# Patient Record
Sex: FEMALE | Race: WHITE | NOT HISPANIC OR LATINO | Employment: OTHER | URBAN - NONMETROPOLITAN AREA
[De-identification: names, ages, dates, MRNs, and addresses within clinical notes are randomized per-mention and may not be internally consistent; named-entity substitution may affect disease eponyms.]

---

## 2022-05-19 ENCOUNTER — HISTORICAL (OUTPATIENT)
Dept: ADMINISTRATIVE | Facility: HOSPITAL | Age: 82
End: 2022-05-19

## 2022-05-27 ENCOUNTER — HISTORICAL (OUTPATIENT)
Dept: ADMINISTRATIVE | Facility: HOSPITAL | Age: 82
End: 2022-05-27

## 2022-05-31 ENCOUNTER — HISTORICAL (OUTPATIENT)
Dept: ADMINISTRATIVE | Facility: HOSPITAL | Age: 82
End: 2022-05-31

## 2022-06-28 ENCOUNTER — HISTORICAL (OUTPATIENT)
Dept: ADMINISTRATIVE | Facility: HOSPITAL | Age: 82
End: 2022-06-28

## 2023-07-24 ENCOUNTER — HOSPITAL ENCOUNTER (EMERGENCY)
Facility: HOSPITAL | Age: 83
Discharge: HOME | End: 2023-07-24
Attending: FAMILY MEDICINE
Payer: MEDICARE

## 2023-07-24 VITALS
DIASTOLIC BLOOD PRESSURE: 74 MMHG | SYSTOLIC BLOOD PRESSURE: 166 MMHG | TEMPERATURE: 99 F | OXYGEN SATURATION: 97 % | WEIGHT: 189 LBS | RESPIRATION RATE: 18 BRPM | BODY MASS INDEX: 31.49 KG/M2 | HEART RATE: 85 BPM | HEIGHT: 65 IN

## 2023-07-24 DIAGNOSIS — M62.838 MUSCLE SPASM: Primary | ICD-10-CM

## 2023-07-24 LAB
ANION GAP SERPL CALC-SCNC: 10 MEQ/L (ref 2–13)
BASOPHILS # BLD AUTO: 0.02 X10(3)/MCL (ref 0.01–0.08)
BASOPHILS NFR BLD AUTO: 0.2 % (ref 0.1–1.2)
BUN SERPL-MCNC: 17 MG/DL (ref 7–20)
CALCIUM SERPL-MCNC: 10 MG/DL (ref 8.4–10.2)
CHLORIDE SERPL-SCNC: 101 MMOL/L (ref 98–110)
CO2 SERPL-SCNC: 26 MMOL/L (ref 21–32)
CREAT SERPL-MCNC: 0.75 MG/DL (ref 0.66–1.25)
CREAT/UREA NIT SERPL: 23 (ref 12–20)
EOSINOPHIL # BLD AUTO: 0.1 X10(3)/MCL (ref 0.04–0.36)
EOSINOPHIL NFR BLD AUTO: 1.2 % (ref 0.7–7)
ERYTHROCYTE [DISTWIDTH] IN BLOOD BY AUTOMATED COUNT: 13.9 % (ref 11–14.5)
GFR SERPLBLD CREATININE-BSD FMLA CKD-EPI: 80 MLS/MIN/1.73/M2
GLUCOSE SERPL-MCNC: 169 MG/DL (ref 70–115)
HCT VFR BLD AUTO: 40.3 % (ref 36–48)
HGB BLD-MCNC: 13.7 G/DL (ref 11.8–16)
IMM GRANULOCYTES # BLD AUTO: 0.02 X10(3)/MCL (ref 0–0.03)
IMM GRANULOCYTES NFR BLD AUTO: 0.2 % (ref 0–0.5)
LYMPHOCYTES # BLD AUTO: 1.5 X10(3)/MCL (ref 1.16–3.74)
LYMPHOCYTES NFR BLD AUTO: 18.5 % (ref 20–55)
MCH RBC QN AUTO: 27.9 PG (ref 27–34)
MCHC RBC AUTO-ENTMCNC: 34 G/DL (ref 31–37)
MCV RBC AUTO: 82.1 FL (ref 79–99)
MONOCYTES # BLD AUTO: 0.74 X10(3)/MCL (ref 0.24–0.36)
MONOCYTES NFR BLD AUTO: 9.1 % (ref 4.7–12.5)
NEUTROPHILS # BLD AUTO: 5.75 X10(3)/MCL (ref 1.56–6.13)
NEUTROPHILS NFR BLD AUTO: 70.8 % (ref 37–73)
NRBC BLD AUTO-RTO: 0 %
PLATELET # BLD AUTO: 200 X10(3)/MCL (ref 140–371)
PMV BLD AUTO: 9.3 FL (ref 9.4–12.4)
POTASSIUM SERPL-SCNC: 4.6 MMOL/L (ref 3.5–5.1)
RBC # BLD AUTO: 4.91 X10(6)/MCL (ref 4–5.1)
SODIUM SERPL-SCNC: 137 MMOL/L (ref 135–145)
WBC # SPEC AUTO: 8.13 X10(3)/MCL (ref 4–11.5)

## 2023-07-24 PROCEDURE — 99283 EMERGENCY DEPT VISIT LOW MDM: CPT

## 2023-07-24 PROCEDURE — 36415 COLL VENOUS BLD VENIPUNCTURE: CPT | Performed by: FAMILY MEDICINE

## 2023-07-24 PROCEDURE — 85025 COMPLETE CBC W/AUTO DIFF WBC: CPT | Performed by: FAMILY MEDICINE

## 2023-07-24 PROCEDURE — 80048 BASIC METABOLIC PNL TOTAL CA: CPT | Performed by: FAMILY MEDICINE

## 2023-07-24 RX ORDER — CYCLOBENZAPRINE HCL 10 MG
5 TABLET ORAL 3 TIMES DAILY PRN
Qty: 15 TABLET | Refills: 0 | Status: SHIPPED | OUTPATIENT
Start: 2023-07-24 | End: 2023-07-24 | Stop reason: ALTCHOICE

## 2023-07-24 RX ORDER — CYCLOBENZAPRINE HCL 5 MG
5 TABLET ORAL 3 TIMES DAILY PRN
Qty: 15 TABLET | Refills: 0 | Status: SHIPPED | OUTPATIENT
Start: 2023-07-24 | End: 2023-07-29

## 2023-07-24 NOTE — ED PROVIDER NOTES
Encounter Date: 7/24/2023       History     Chief Complaint   Patient presents with    Spasms     TO ED WITH C/O MUSCLE SPASMS TO RIGHT UPPER LEG X 3 DAYS. HER PCP DR HILARIO IS VACATION.      82-year-old white female presents to the emergency room complaining of right thigh muscle spasm for the last 3 days.  Patient denies any known trauma.  Patient also complains of chronic swelling to bilateral lower extremities but she states Dr. Chappell's told her she is on all the medications she can not have at the present time due to that situation.  Patient denies any other complaints.    The history is provided by the patient.   Review of patient's allergies indicates:   Allergen Reactions    Pcn [penicillins]      Past Medical History:   Diagnosis Date    Diabetes mellitus     Hypertension      History reviewed. No pertinent surgical history.  History reviewed. No pertinent family history.  Social History     Tobacco Use    Smoking status: Never    Smokeless tobacco: Never   Substance Use Topics    Alcohol use: Never    Drug use: Never     Review of Systems   Musculoskeletal:         Muscle spasms   All other systems reviewed and are negative.    Physical Exam     Initial Vitals [07/24/23 1003]   BP Pulse Resp Temp SpO2   (!) 166/81 94 20 98.7 °F (37.1 °C) 97 %      MAP       --         Physical Exam    Nursing note and vitals reviewed.  Constitutional:   Well-developed well-nourished white female alert in no acute distress.   HENT:   Head is atraumatic and normocephalic.  Oropharynx is clear moist mucous membranes.  Nose is clear with no discharge.   Neck:   Neck is supple with full range of motion no cervical lymphadenopathy noted.   Cardiovascular:            Heart is regular rate and rhythm without murmur.   Pulmonary/Chest:   Lungs are clear to auscultation bilaterally.   Abdominal:   Abdomen positive bowel sounds throughout.  Abdomen is soft and nontender no guarding or rebound.  No CVA tenderness noted.    Musculoskeletal:      Comments: Extremities with full range of motion throughout with mild-to-moderate right anterior thigh tenderness.  Two to 3+ pretibial edema bilateral lower extremities.  No clubbing or cyanosis noted.     Neurological:   Patient is alert and oriented x3.  Bilateral upper and lower extremities normal sensation and strength.   Skin:   Skin is warm and dry.   Psychiatric:   Patient's mood and affect are normal.  Patient's thought content behavior normal as well.       ED Course   Procedures  Labs Reviewed   BASIC METABOLIC PANEL - Abnormal; Notable for the following components:       Result Value    Glucose Level 169 (*)     BUN/Creatinine Ratio 23 (*)     All other components within normal limits   CBC WITH DIFFERENTIAL - Abnormal; Notable for the following components:    MPV 9.3 (*)     Lymph % 18.5 (*)     Mono # 0.74 (*)     All other components within normal limits   CBC W/ AUTO DIFFERENTIAL    Narrative:     The following orders were created for panel order CBC auto differential.  Procedure                               Abnormality         Status                     ---------                               -----------         ------                     CBC with Differential[[id number]]        Abnormal            Final result                 Please view results for these tests on the individual orders.          Imaging Results    None          Medications - No data to display  Medical Decision Making:   Initial Assessment:   Muscle spasms  Differential Diagnosis:   Hypokalemia, dependent edema  Clinical Tests:   Lab Tests: Ordered and Reviewed  The following lab test(s) were unremarkable: CBC and BMP  ED Management:  I will go ahead and order CBC and BMP to check her electrolytes.  Patient's blood work is all normal.  I will go ahead and place patient on Flexeril 5 mg t.i.d. as needed for muscle spasm and have her follow up with Dr. Bradley.                        Clinical Impression:    Final diagnoses:  [M62.838] Muscle spasm (Primary)               Tyrese Nogueira MD  07/24/23 1126

## 2023-08-15 ENCOUNTER — HOSPITAL ENCOUNTER (EMERGENCY)
Facility: HOSPITAL | Age: 83
Discharge: HOME | End: 2023-08-15
Payer: MEDICARE

## 2023-08-15 VITALS
OXYGEN SATURATION: 98 % | BODY MASS INDEX: 31.32 KG/M2 | WEIGHT: 188 LBS | DIASTOLIC BLOOD PRESSURE: 62 MMHG | RESPIRATION RATE: 18 BRPM | HEART RATE: 68 BPM | TEMPERATURE: 98 F | SYSTOLIC BLOOD PRESSURE: 190 MMHG | HEIGHT: 65 IN

## 2023-08-15 DIAGNOSIS — F41.9 ANXIETY: ICD-10-CM

## 2023-08-15 DIAGNOSIS — N30.00 ACUTE CYSTITIS WITHOUT HEMATURIA: ICD-10-CM

## 2023-08-15 DIAGNOSIS — I10 UNCONTROLLED HYPERTENSION: ICD-10-CM

## 2023-08-15 DIAGNOSIS — R42 DIZZINESS: ICD-10-CM

## 2023-08-15 DIAGNOSIS — R42 EPISODIC LIGHTHEADEDNESS: Primary | ICD-10-CM

## 2023-08-15 LAB
ALBUMIN SERPL-MCNC: 4 G/DL (ref 3.4–5)
ALBUMIN/GLOB SERPL: 1.3 RATIO
ALP SERPL-CCNC: 50 UNIT/L (ref 50–144)
ALT SERPL-CCNC: 26 UNIT/L (ref 1–45)
ANION GAP SERPL CALC-SCNC: 12 MEQ/L (ref 2–13)
APPEARANCE UR: CLEAR
AST SERPL-CCNC: 29 UNIT/L (ref 14–36)
BACTERIA #/AREA URNS AUTO: ABNORMAL /HPF
BASOPHILS # BLD AUTO: 0.05 X10(3)/MCL (ref 0.01–0.08)
BASOPHILS NFR BLD AUTO: 0.7 % (ref 0.1–1.2)
BILIRUB SERPL-MCNC: 0.5 MG/DL (ref 0–1)
BILIRUB UR QL STRIP.AUTO: NEGATIVE
BUN SERPL-MCNC: 14 MG/DL (ref 7–20)
CALCIUM SERPL-MCNC: 9.2 MG/DL (ref 8.4–10.2)
CHLORIDE SERPL-SCNC: 106 MMOL/L (ref 98–110)
CO2 SERPL-SCNC: 23 MMOL/L (ref 21–32)
COLOR UR: YELLOW
CREAT SERPL-MCNC: 0.68 MG/DL (ref 0.66–1.25)
CREAT/UREA NIT SERPL: 21 (ref 12–20)
EOSINOPHIL # BLD AUTO: 0.3 X10(3)/MCL (ref 0.04–0.36)
EOSINOPHIL NFR BLD AUTO: 3.9 % (ref 0.7–7)
ERYTHROCYTE [DISTWIDTH] IN BLOOD BY AUTOMATED COUNT: 14.4 % (ref 11–14.5)
GFR SERPLBLD CREATININE-BSD FMLA CKD-EPI: 88 MLS/MIN/1.73/M2
GLOBULIN SER-MCNC: 3.1 GM/DL (ref 2–3.9)
GLUCOSE SERPL-MCNC: 66 MG/DL (ref 70–115)
GLUCOSE UR QL STRIP.AUTO: NEGATIVE
HCT VFR BLD AUTO: 37.8 % (ref 36–48)
HGB BLD-MCNC: 12.9 G/DL (ref 11.8–16)
IMM GRANULOCYTES # BLD AUTO: 0.01 X10(3)/MCL (ref 0–0.03)
IMM GRANULOCYTES NFR BLD AUTO: 0.1 % (ref 0–0.5)
KETONES UR QL STRIP.AUTO: NEGATIVE
LEUKOCYTE ESTERASE UR QL STRIP.AUTO: ABNORMAL
LYMPHOCYTES # BLD AUTO: 2.61 X10(3)/MCL (ref 1.16–3.74)
LYMPHOCYTES NFR BLD AUTO: 34.1 % (ref 20–55)
MCH RBC QN AUTO: 28.4 PG (ref 27–34)
MCHC RBC AUTO-ENTMCNC: 34.1 G/DL (ref 31–37)
MCV RBC AUTO: 83.3 FL (ref 79–99)
MONOCYTES # BLD AUTO: 0.88 X10(3)/MCL (ref 0.24–0.36)
MONOCYTES NFR BLD AUTO: 11.5 % (ref 4.7–12.5)
NEUTROPHILS # BLD AUTO: 3.8 X10(3)/MCL (ref 1.56–6.13)
NEUTROPHILS NFR BLD AUTO: 49.7 % (ref 37–73)
NITRITE UR QL STRIP.AUTO: NEGATIVE
NRBC BLD AUTO-RTO: 0 %
PH UR STRIP.AUTO: 6 [PH]
PLATELET # BLD AUTO: 180 X10(3)/MCL (ref 140–371)
PMV BLD AUTO: 9.7 FL (ref 9.4–12.4)
POTASSIUM SERPL-SCNC: 4 MMOL/L (ref 3.5–5.1)
PROT SERPL-MCNC: 7.1 GM/DL (ref 6.3–8.2)
PROT UR QL STRIP.AUTO: ABNORMAL
RBC # BLD AUTO: 4.54 X10(6)/MCL (ref 4–5.1)
RBC #/AREA URNS AUTO: ABNORMAL /HPF
RBC UR QL AUTO: ABNORMAL
SODIUM SERPL-SCNC: 141 MMOL/L (ref 135–145)
SP GR UR STRIP.AUTO: 1.01
SQUAMOUS #/AREA URNS AUTO: ABNORMAL /HPF
UROBILINOGEN UR STRIP-ACNC: 1
WBC # SPEC AUTO: 7.65 X10(3)/MCL (ref 4–11.5)
WBC #/AREA URNS AUTO: ABNORMAL /HPF

## 2023-08-15 PROCEDURE — 36415 COLL VENOUS BLD VENIPUNCTURE: CPT

## 2023-08-15 PROCEDURE — 93010 ELECTROCARDIOGRAM REPORT: CPT | Mod: ,,, | Performed by: INTERNAL MEDICINE

## 2023-08-15 PROCEDURE — 63600175 PHARM REV CODE 636 W HCPCS

## 2023-08-15 PROCEDURE — 93005 ELECTROCARDIOGRAM TRACING: CPT

## 2023-08-15 PROCEDURE — 80053 COMPREHEN METABOLIC PANEL: CPT

## 2023-08-15 PROCEDURE — 81001 URINALYSIS AUTO W/SCOPE: CPT

## 2023-08-15 PROCEDURE — 87088 URINE BACTERIA CULTURE: CPT

## 2023-08-15 PROCEDURE — 96376 TX/PRO/DX INJ SAME DRUG ADON: CPT

## 2023-08-15 PROCEDURE — 96365 THER/PROPH/DIAG IV INF INIT: CPT

## 2023-08-15 PROCEDURE — 96375 TX/PRO/DX INJ NEW DRUG ADDON: CPT

## 2023-08-15 PROCEDURE — 25000003 PHARM REV CODE 250

## 2023-08-15 PROCEDURE — 85025 COMPLETE CBC W/AUTO DIFF WBC: CPT

## 2023-08-15 PROCEDURE — 93010 EKG 12-LEAD: ICD-10-PCS | Mod: ,,, | Performed by: INTERNAL MEDICINE

## 2023-08-15 PROCEDURE — 87077 CULTURE AEROBIC IDENTIFY: CPT

## 2023-08-15 PROCEDURE — 99285 EMERGENCY DEPT VISIT HI MDM: CPT | Mod: 25

## 2023-08-15 RX ORDER — DIAZEPAM 5 MG/1
5 TABLET ORAL
Status: COMPLETED | OUTPATIENT
Start: 2023-08-15 | End: 2023-08-15

## 2023-08-15 RX ORDER — LABETALOL HYDROCHLORIDE 5 MG/ML
10 INJECTION, SOLUTION INTRAVENOUS
Status: DISCONTINUED | OUTPATIENT
Start: 2023-08-15 | End: 2023-08-15

## 2023-08-15 RX ORDER — LABETALOL HYDROCHLORIDE 5 MG/ML
20 INJECTION, SOLUTION INTRAVENOUS
Status: DISCONTINUED | OUTPATIENT
Start: 2023-08-15 | End: 2023-08-15

## 2023-08-15 RX ORDER — NITROFURANTOIN 25; 75 MG/1; MG/1
100 CAPSULE ORAL 2 TIMES DAILY
Qty: 10 CAPSULE | Refills: 0 | Status: SHIPPED | OUTPATIENT
Start: 2023-08-15 | End: 2023-08-20

## 2023-08-15 RX ORDER — LABETALOL HCL 20 MG/4 ML
20 SYRINGE (ML) INTRAVENOUS
Status: COMPLETED | OUTPATIENT
Start: 2023-08-15 | End: 2023-08-15

## 2023-08-15 RX ORDER — LABETALOL HCL 20 MG/4 ML
10 SYRINGE (ML) INTRAVENOUS
Status: COMPLETED | OUTPATIENT
Start: 2023-08-15 | End: 2023-08-15

## 2023-08-15 RX ADMIN — LABETALOL HYDROCHLORIDE 10 MG: 5 INJECTION, SOLUTION INTRAVENOUS at 09:08

## 2023-08-15 RX ADMIN — DEXTROSE MONOHYDRATE 2 G: 5 INJECTION INTRAVENOUS at 10:08

## 2023-08-15 RX ADMIN — LABETALOL HYDROCHLORIDE 20 MG: 5 INJECTION, SOLUTION INTRAVENOUS at 10:08

## 2023-08-15 RX ADMIN — DIAZEPAM 5 MG: 5 TABLET ORAL at 09:08

## 2023-08-16 NOTE — ED PROVIDER NOTES
Encounter Date: 8/15/2023       History     Chief Complaint   Patient presents with    Dizziness      PT STATES JUST SITTING IN HER CHAIR W/ ONSET OF DIZZINESS. ALSO STATES RECENT CHANGE IN BP MEDS. ALSO STATES HX OF DM. PT DENIES ANY CHEST PAIN.    Hypertension     82-year-old female presents complaining of episode of lightheadedness prior to arrival.  She reports this has happened a few times before, and she contributes it to low blood pressure.  She specifically says the room is not spinning.  There was no nausea associated with the lightheadedness.  She denies any visual change, any headache, or any weakness.  On arrival, her blood pressure is extremely high.  She says she wants to go home, and does not like emergency rooms.    The history is provided by the patient.     Review of patient's allergies indicates:   Allergen Reactions    Pcn [penicillins]      Past Medical History:   Diagnosis Date    Diabetes mellitus     Hypertension      Past Surgical History:   Procedure Laterality Date    APPENDECTOMY      HYSTERECTOMY       History reviewed. No pertinent family history.  Social History     Tobacco Use    Smoking status: Never    Smokeless tobacco: Never   Substance Use Topics    Alcohol use: Never    Drug use: Never     Review of Systems   Constitutional:  Negative for fever.   HENT:  Negative for sore throat.    Respiratory:  Negative for shortness of breath.    Cardiovascular:  Negative for chest pain.   Gastrointestinal:  Negative for nausea.   Genitourinary:  Negative for dysuria.   Musculoskeletal:  Negative for back pain.   Skin:  Negative for rash.   Neurological:  Positive for light-headedness. Negative for weakness.   Hematological:  Does not bruise/bleed easily.   Psychiatric/Behavioral:  The patient is nervous/anxious.    All other systems reviewed and are negative.      Physical Exam     Initial Vitals [08/15/23 2140]   BP Pulse Resp Temp SpO2   (!) 248/95 63 18 98.4 °F (36.9 °C) 96 %      MAP        --         Physical Exam    Nursing note and vitals reviewed.  Constitutional: Vital signs are normal. She appears well-developed and well-nourished. She is cooperative.   HENT:   Head: Normocephalic and atraumatic.   Right Ear: External ear normal.   Left Ear: External ear normal.   Mouth/Throat: Oropharynx is clear and moist.   Eyes: Conjunctivae, EOM and lids are normal. Pupils are equal, round, and reactive to light.   Neck: Trachea normal. Neck supple.   Normal range of motion.  Cardiovascular:  Normal rate, regular rhythm, normal heart sounds and intact distal pulses.           Pulmonary/Chest: Breath sounds normal.   Abdominal: Abdomen is soft. Bowel sounds are normal.   Musculoskeletal:         General: Normal range of motion.      Cervical back: Normal, normal range of motion and neck supple.      Lumbar back: Normal.     Neurological: She is alert and oriented to person, place, and time. She has normal strength. Coordination normal. GCS score is 15. GCS eye subscore is 4. GCS verbal subscore is 5. GCS motor subscore is 6.   Skin: Skin is warm, dry and intact. Capillary refill takes less than 2 seconds.   Psychiatric: Her speech is normal and behavior is normal. Judgment and thought content normal. Cognition and memory are normal.   Patient appears a bit anxious         ED Course   Procedures  Labs Reviewed   COMPREHENSIVE METABOLIC PANEL - Abnormal; Notable for the following components:       Result Value    Glucose Level 66 (*)     BUN/Creatinine Ratio 21 (*)     All other components within normal limits   URINALYSIS, REFLEX TO URINE CULTURE - Abnormal; Notable for the following components:    Protein, UA Trace (*)     Blood, UA Trace-Intact (*)     Leukocyte Esterase, UA Small (*)     All other components within normal limits    Narrative:      URINE STABILITY IS 2 HOURS AT ROOM TEMP OR    SIX HOURS REFRIGERATED. PERFORMING TESTING ON    SPECIMENS GREATER THAN THIS AGE MAY AFFECT THE    FOLLOWING  TESTS:    PH          SPECIFIC GRAVITY           BLOOD    CLARITY     BILIRUBIN               UROBILINOGEN   CBC WITH DIFFERENTIAL - Abnormal; Notable for the following components:    Mono # 0.88 (*)     All other components within normal limits   URINALYSIS, MICROSCOPIC - Abnormal; Notable for the following components:    Bacteria, UA 3+ (*)     WBC, UA 6-10 (*)     All other components within normal limits   CULTURE, URINE   CBC W/ AUTO DIFFERENTIAL    Narrative:     The following orders were created for panel order CBC auto differential.  Procedure                               Abnormality         Status                     ---------                               -----------         ------                     CBC with Differential[[id number]]        Abnormal            Final result                 Please view results for these tests on the individual orders.        ECG Results              EKG 12-lead (Preliminary result)  Result time 08/15/23 21:59:43      Wet Read by Nadeem Gunter MD (08/15/23 21:59:43, [facility], Emergency Medicine)    Sinus bradycardia, heart rate 55, there is a PAC noted on the EKG, normal axis, normal intervals, normal P waves, normal QRS, normal ST segments, normal T-waves.                                  Imaging Results              CT Head Without Contrast (Final result)  Result time 08/15/23 22:13:10      Final result by Yakelin Alvarez III, MD (08/15/23 22:13:10)                   Impression:      1. Chronic changes are present including atrophy and ischemia.  See above comments.      Electronically signed by: Yakelin Alvarez  Date:    08/15/2023  Time:    22:13               Narrative:    EXAMINATION:  STUDY: CT HEAD WITHOUT CONTRAST    CLINICAL HISTORY AND TECHNIQUE:  Quentin Ta RT on 8/15/2023 10:10 PM    PROCEDURE: CT BRAIN WO CONT    CLINICAL HX: ER    TODAY - LIGHT-HEADEDNESS AND ELEVATED BP    PMH: DIABETES AND HTN    IV CONTRAST: NONE    ORAL  CONTRAST: NONE    RECTAL CONTRAST: NONE    AXIAL IMAGING @ 5MM INTERVALS W/MULTIPLANAR RECONSTRUCTION OF IMAGES    TOTAL IMAGE NUMBER: 109    CTDIvol(mGy): HEAD: 52.3 BODY:    DLP(mGycm): HEAD: 1070.5 BODY:    # CT'S LAST 12 MONTHS: 1    TECH: ALEXANDERR    PT TRANSPORTED W/O INCIDENT    This patient has had 1 CT and nuclear medicine scans performed within the last 12 months.    The following DOSE REDUCTION TECHNIQUES are used for all CT scans at [facility]:    1. Automated exposure control.  2. Adjustment of the mA and/or kv according to patient size.  3. Use of iterative reconstruction technique.    COMPARISON:  None    FINDINGS:  Axial imaging through the head/brain was performed. Moderate cerebral atrophy accentuates the ventricles and sulci and mild to moderate chronic ischemic changes are noted within the deep white matter of both cerebral hemispheres.  Atherosclerotic calcifications are noted within the vertebrobasilar system and both carotid siphons.  I see no intraparenchymal masses, hemorrhagic lesions, or dominant wedge-shaped infarcts. I see no extra-axial masses or abnormal fluid collections.                                       Medications   cefTRIAXone (ROCEPHIN) 2 g in dextrose 5 % in water (D5W) 100 mL IVPB (MB+) (has no administration in time range)   diazePAM tablet 5 mg (5 mg Oral Given 8/15/23 2155)   labetalol 20 mg/4 mL (5 mg/mL) IV syring (10 mg Intravenous Given 8/15/23 2155)   labetalol 20 mg/4 mL (5 mg/mL) IV syring (20 mg Intravenous Given 8/15/23 2230)     Medical Decision Making:   Initial Assessment:   Episodic lightheadedness, elevated blood pressure, anxiety  Differential Diagnosis:   Vertigo, posterior circulation stroke, orthostatic hypotension  ED Management:  CT head, EKG, labs  Valium p.o., labetalol IV                          Clinical Impression:   Final diagnoses:  [R42] Dizziness  [R42] Episodic lightheadedness (Primary)  [I10] Uncontrolled  hypertension  [F41.9] Anxiety  [N30.00] Acute cystitis without hematuria        ED Disposition Condition    Discharge Good          ED Prescriptions       Medication Sig Dispense Start Date End Date Auth. Provider    nitrofurantoin, macrocrystal-monohydrate, (MACROBID) 100 MG capsule Take 1 capsule (100 mg total) by mouth 2 (two) times daily. for 5 days 10 capsule 8/15/2023 8/20/2023 Nadeem Gunter MD          Follow-up Information       Follow up With Specialties Details Why Contact Info    Kit Stephens MD Internal Medicine, General Practice Call in 1 day  [address]  [address]  [place] [address]  [phone]               Nadeem Gunter MD  08/15/23 4200

## 2023-08-18 LAB — BACTERIA UR CULT: ABNORMAL

## 2023-10-07 ENCOUNTER — HOSPITAL ENCOUNTER (EMERGENCY)
Facility: HOSPITAL | Age: 83
Discharge: HOME | End: 2023-10-07
Attending: FAMILY MEDICINE
Payer: MEDICARE

## 2023-10-07 VITALS
DIASTOLIC BLOOD PRESSURE: 75 MMHG | OXYGEN SATURATION: 97 % | BODY MASS INDEX: 29.99 KG/M2 | WEIGHT: 180 LBS | HEART RATE: 60 BPM | RESPIRATION RATE: 20 BRPM | HEIGHT: 65 IN | SYSTOLIC BLOOD PRESSURE: 178 MMHG | TEMPERATURE: 98 F

## 2023-10-07 DIAGNOSIS — S76.211A INGUINAL STRAIN, RIGHT, INITIAL ENCOUNTER: Primary | ICD-10-CM

## 2023-10-07 PROCEDURE — 99284 EMERGENCY DEPT VISIT MOD MDM: CPT

## 2023-10-07 RX ORDER — TRAMADOL HYDROCHLORIDE 50 MG/1
50 TABLET ORAL EVERY 6 HOURS PRN
Qty: 12 TABLET | Refills: 0 | Status: SHIPPED | OUTPATIENT
Start: 2023-10-07 | End: 2023-10-11

## 2023-10-07 NOTE — ED PROVIDER NOTES
History     Chief Complaint   Patient presents with    Groin Pain     TO ED VIA W/C WITH C/O GROIN PAIN X 2 WEEKS. PT SAID SHE WAS HERE 2 WEEKS AGO AND WAS GIVEN IV FLUIDS AND AND PAIN MEDICINE       · Groin Pain    TO ED VIA W/C WITH C/O GROIN PAIN X 2 WEEKS. PT SAID SHE WAS HERE 2 WEEKS AGO AND WAS GIVEN IV FLUIDS AND AND PAIN MEDICINE patient is  ambulatory came the emergency room saying that she is hurting on the medial aspect of her thigh there is no bruising there is no trauma she denies any history of fall she says that she can walk on the legs the pain is located on the medial aspect of her leg and on palpation I did not elicit any bony abnormality and the patient did not complain of any tenderness either          Review of patient's allergies indicates:   Allergen Reactions    Pcn [penicillins]      Past Medical History:   Diagnosis Date    Diabetes mellitus     Hypertension      Past Surgical History:   Procedure Laterality Date    APPENDECTOMY      HYSTERECTOMY       History reviewed. No pertinent family history.  Social History     Tobacco Use    Smoking status: Never    Smokeless tobacco: Never   Substance Use Topics    Alcohol use: Never    Drug use: Never     Review of Systems   Constitutional:  Negative for fever.   HENT:  Negative for sore throat.    Respiratory:  Negative for shortness of breath.    Cardiovascular:  Negative for chest pain.   Gastrointestinal:  Negative for abdominal distention, abdominal pain and nausea.   Genitourinary:  Negative for dysuria, hematuria, menstrual problem and pelvic pain.   Musculoskeletal:  Negative for back pain.        Right leg pain medial aspect   Skin:  Negative for rash.   Neurological:  Negative for dizziness, facial asymmetry, weakness and headaches.   Hematological:  Does not bruise/bleed easily.       Physical Exam     Initial Vitals   BP Pulse Resp Temp SpO2   10/07/23 0911 10/07/23 0907 10/07/23 0907 10/07/23 0907 10/07/23 0907   (!) 177/77 64  (!) 22 98.4 °F (36.9 °C) 96 %      MAP       --                Physical Exam    Nursing note and vitals reviewed.  Constitutional: She appears well-developed.   HENT:   Head: Normocephalic and atraumatic.   Eyes: Pupils are equal, round, and reactive to light.   Neck:   Normal range of motion.  Cardiovascular:  Normal rate, regular rhythm, normal heart sounds and intact distal pulses.           Pulmonary/Chest: Breath sounds normal. No respiratory distress. She has no wheezes. She has no rhonchi. She has no rales.   Musculoskeletal:         General: Normal range of motion.      Cervical back: Normal range of motion.      Comments: Right leg pain medial aspect no swelling no bruising no history of trauma no tenderness to palpation     Neurological: She is alert and oriented to person, place, and time.   Skin: Skin is warm.   Psychiatric: She has a normal mood and affect.         ED Course   Procedures  Labs Reviewed - No data to display       Imaging Results              X-Ray Hip 2 or 3 views Right (with Pelvis when performed) (In process)                   X-Rays:   Independently Interpreted Readings:   Other Readings:  Degenerative changes no acute fracture    Medications - No data to display  Medical Decision Making  Since there is no trauma no injury suspect musculoskeletal pain I will give her pain medication x-ray was negative for any acute fracture the patient is ambulatory the onset is over 2 weeks I recommended that the patient should follow with the orthopedic physician    Amount and/or Complexity of Data Reviewed  Radiology: ordered.    Risk  Prescription drug management.                               Clinical Impression:   Final diagnoses:  [K66.818D] Inguinal strain, right, initial encounter (Primary)        ED Disposition Condition    Discharge Stable          ED Prescriptions       Medication Sig Dispense Start Date End Date Auth. Provider    traMADoL (ULTRAM) 50 mg tablet Take 1 tablet (50 mg total)  by mouth every 6 (six) hours as needed for Pain. 12 tablet 10/7/2023 10/11/2023 Ej Herrera MD          Follow-up Information       Follow up With Specialties Details Why Contact Info    Kit Stephens MD Internal Medicine, General Practice In 1 day  [address] [facility]  [address]  [place] [address]  [phone]               Ej Herrera MD  10/07/23 1012